# Patient Record
Sex: FEMALE | Race: WHITE | Employment: FULL TIME | URBAN - METROPOLITAN AREA
[De-identification: names, ages, dates, MRNs, and addresses within clinical notes are randomized per-mention and may not be internally consistent; named-entity substitution may affect disease eponyms.]

---

## 2019-11-13 ENCOUNTER — OFFICE VISIT (OUTPATIENT)
Dept: OPTOMETRY | Facility: CLINIC | Age: 29
End: 2019-11-13
Payer: COMMERCIAL

## 2019-11-13 DIAGNOSIS — H52.13 MYOPIA OF BOTH EYES WITH ASTIGMATISM: Primary | ICD-10-CM

## 2019-11-13 DIAGNOSIS — H52.203 MYOPIA OF BOTH EYES WITH ASTIGMATISM: Primary | ICD-10-CM

## 2019-11-13 ASSESSMENT — CUP TO DISC RATIO
OD_RATIO: 0.15
OS_RATIO: 0.15

## 2019-11-13 ASSESSMENT — TONOMETRY
IOP_METHOD: ICARE
OD_IOP_MMHG: 22
OS_IOP_MMHG: 22

## 2019-11-13 ASSESSMENT — REFRACTION_WEARINGRX
OS_AXIS: 100
OD_CYLINDER: +1.00
OS_SPHERE: -6.25
OD_SPHERE: -7.50
OD_AXIS: 075
OS_CYLINDER: +1.00

## 2019-11-13 ASSESSMENT — VISUAL ACUITY
OS_CC: 20/20
CORRECTION_TYPE: GLASSES
OD_CC: 20/20-1
METHOD: SNELLEN - LINEAR

## 2019-11-13 ASSESSMENT — REFRACTION_MANIFEST
OD_SPHERE: -7.75
OD_AXIS: 090
OD_CYLINDER: +1.25
OS_CYLINDER: +1.25
OS_AXIS: 105
OS_SPHERE: -6.00

## 2019-11-13 ASSESSMENT — SLIT LAMP EXAM - LIDS
COMMENTS: NORMAL
COMMENTS: NORMAL

## 2019-11-13 ASSESSMENT — CONF VISUAL FIELD
OS_NORMAL: 1
OD_NORMAL: 1

## 2019-11-13 ASSESSMENT — EXTERNAL EXAM - RIGHT EYE: OD_EXAM: NORMAL

## 2019-11-13 ASSESSMENT — EXTERNAL EXAM - LEFT EYE: OS_EXAM: NORMAL

## 2019-11-13 NOTE — PROGRESS NOTES
A/P  1.) Myopia/Astigmatism each eye  -Fairly stable spec Rx, updated today  -Previous CL wearer, will return for contact visit prn  -Dilated ocular health unremarkable OU    2.) H/p pseudotumor per pt  -Optic nerves largely healthy today  -Small crowded discs, monitor for change    Monitor 1-2 years routine, sooner prn for CL fit

## 2020-01-17 ENCOUNTER — OFFICE VISIT (OUTPATIENT)
Dept: OPTOMETRY | Facility: CLINIC | Age: 30
End: 2020-01-17
Payer: COMMERCIAL

## 2020-01-17 DIAGNOSIS — H52.203 MYOPIA OF BOTH EYES WITH ASTIGMATISM: Primary | ICD-10-CM

## 2020-01-17 DIAGNOSIS — H52.13 MYOPIA OF BOTH EYES WITH ASTIGMATISM: Primary | ICD-10-CM

## 2020-01-17 ASSESSMENT — EXTERNAL EXAM - LEFT EYE: OS_EXAM: NORMAL

## 2020-01-17 ASSESSMENT — REFRACTION_CURRENTRX
OS_DIAMETER: 14.3
OS_CYLINDER: -0.75
OD_BASECURVE: 8.5
OS_BASECURVE: 8.5
OD_DIAMETER: 14.3
OD_BRAND: ACUVUE OASYS 1 DAY ASTIGMATISM
OD_AXIS: 180
OS_BRAND: ACUVUE OASYS 1 DAY ASTIGMATISM
OS_SPHERE: -4.50
OD_SPHERE: -6.00
OS_AXIS: 010
OD_CYLINDER: -0.75

## 2020-01-17 ASSESSMENT — VISUAL ACUITY
OS_CC: 20/20
OD_CC: 20/20-1
CORRECTION_TYPE: GLASSES
METHOD: SNELLEN - LINEAR

## 2020-01-17 ASSESSMENT — EXTERNAL EXAM - RIGHT EYE: OD_EXAM: NORMAL

## 2020-01-17 ASSESSMENT — SLIT LAMP EXAM - LIDS
COMMENTS: NORMAL
COMMENTS: NORMAL

## 2020-01-17 NOTE — PROGRESS NOTES
A/P  1.) Myopia/Astigmatism each eye  -CLRx updated today, good vision/comfort/fit in [facility] 1 Day Astig  -Needs toric each eye for best vision  -Rx updated, okay to order if working well. Contact clinic with any CL issues    Monitor annually

## 2020-08-31 ENCOUNTER — APPOINTMENT (OUTPATIENT)
Dept: LAB | Facility: CLINIC | Age: 30
End: 2020-08-31
Payer: COMMERCIAL

## 2020-08-31 ENCOUNTER — RESULTS ONLY (OUTPATIENT)
Dept: LAB | Age: 30
End: 2020-08-31

## 2020-09-01 LAB
SARS-COV-2 RNA SPEC QL NAA+PROBE: NOT DETECTED
SPECIMEN SOURCE: NORMAL

## 2021-01-03 ENCOUNTER — HEALTH MAINTENANCE LETTER (OUTPATIENT)
Age: 31
End: 2021-01-03

## 2021-04-09 ENCOUNTER — MEDICAL CORRESPONDENCE (OUTPATIENT)
Dept: HEALTH INFORMATION MANAGEMENT | Facility: CLINIC | Age: 31
End: 2021-04-09

## 2021-10-10 ENCOUNTER — HEALTH MAINTENANCE LETTER (OUTPATIENT)
Age: 31
End: 2021-10-10

## 2022-01-30 ENCOUNTER — HEALTH MAINTENANCE LETTER (OUTPATIENT)
Age: 32
End: 2022-01-30

## 2022-09-18 ENCOUNTER — HEALTH MAINTENANCE LETTER (OUTPATIENT)
Age: 32
End: 2022-09-18

## 2023-05-07 ENCOUNTER — HEALTH MAINTENANCE LETTER (OUTPATIENT)
Age: 33
End: 2023-05-07